# Patient Record
Sex: FEMALE | Race: WHITE | URBAN - METROPOLITAN AREA
[De-identification: names, ages, dates, MRNs, and addresses within clinical notes are randomized per-mention and may not be internally consistent; named-entity substitution may affect disease eponyms.]

---
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
## 2020-04-13 NOTE — TELEPHONE ENCOUNTER
----- Message from Elverna Apley Page sent at 2020  4:32 PM EDT -----  Regarding: Dr. Kailee Parr Telephone  Medication Refill    :  2000   ID numbers  #[id number] [id number]  Caller (if not patient): n/a  Relationship of caller (if not patient): n/a   Best contact number(s): [phone]  Name of medication and dosage if known:  Albuterol Inhaler  Is patient out of this medication (yes/no): Yes  Pharmacy name: [address] listed in chart? (yes/no): Yes  Pharmacy phone number:   Date of last visit:  2019 02:00 PM

## 2020-04-14 RX ORDER — ALBUTEROL SULFATE 90 UG/1
2 AEROSOL, METERED RESPIRATORY (INHALATION) AS NEEDED
Qty: 1 INHALER | Refills: 1 | Status: SHIPPED | OUTPATIENT
Start: 2020-04-14

## 2020-05-28 ENCOUNTER — VIRTUAL VISIT (OUTPATIENT)
Dept: FAMILY MEDICINE CLINIC | Age: 20
End: 2020-05-28

## 2020-05-28 VITALS — BODY MASS INDEX: 21.79 KG/M2 | TEMPERATURE: 97 F | WEIGHT: 123 LBS

## 2020-05-28 DIAGNOSIS — J02.9 SORE THROAT: Primary | ICD-10-CM

## 2020-05-28 NOTE — PROGRESS NOTES
Wu Stevens is a 23 y.o. female who was seen by synchronous (real-time) audio-video technology on 5/28/2020. Consent: Wu Stevens, who was seen by synchronous (real-time) audio-video technology, and/or her healthcare decision maker, is aware that this patient-initiated, Telehealth encounter on 5/28/2020 is a billable service, with coverage as determined by her insurance carrier. She is aware that she may receive a bill and has provided verbal consent to proceed: Yes. Assessment & Plan:   1. Sore throat  Supportive care, unlikely to be infections based on her social distancing but not impossible. Suggest watchful waiting  Add flonase qhs  Add antihistamine  Increase fluids  Cepacol/chloraseptic throat drops prn for releif  Throat Coat tea is ok and can be hlepful too  If new fevers or worsening symptoms, difficulty swallowing call for advice or seek care. Pt was counseled on risks, benefits and alternatives of treatment options. All questions were asked and answered and the patient was agreeable with the treatment plan as outlined. Subjective:   Wu Stevens is a 23 y.o. female who was seen [facility] S[facility] Th[facility]      Patient reports that she has had a bad sore throat for about 4 days, worsening more over the last 2 days. Taking advil every 6 hours for the past 4 days and the pain is pretty significant, terrible to swallow, lymph nodes. She is traveling to school to get stuff out of her dorm but she's been isolating otherwise  No fevers, checked her temp yesterday and it was 80  SHe's having a lot of post nasal drip with phlegm  Ears are not full, clicking or popping. Not using any allergy meds but has them at home so could if she needed to. Medications, allergies, PMH, PSH, SOCH, JAREN GUZMAN OF [facility] reviewed and updated per routine protocol, see chart for review and changes if not noted here.     ROS  A 12 point review of systems was negative except as noted here or in the HPI. Objective:   Vital Signs: (As obtained by patient/caregiver at home)  Visit Vitals  Temp 97 °F (36.1 °C) (Oral)   Wt 123 lb (55.8 kg)   BMI 21.79 kg/m²        [INSTRUCTIONS:  \"[x]\" Indicates a positive item  \"[]\" Indicates a negative item  -- DELETE ALL ITEMS NOT EXAMINED]    Constitutional: [x] Appears well-developed and well-nourished [x] No apparent distress      [] Abnormal -     Mental status: [x] Alert and awake  [x] Oriented to person/place/time [x] Able to follow commands    [] Abnormal -     Eyes:   EOM    [x]  Normal    [] Abnormal -   Sclera  [x]  Normal    [] Abnormal -          Discharge [x]  None visible   [] Abnormal -     HENT: [x] Normocephalic, atraumatic  [] Abnormal -   [x] Mouth/Throat: Mucous membranes are moist    External Ears [x] Normal  [] Abnormal -    Neck: [x] No visualized mass [] Abnormal -     Pulmonary/Chest: [x] Respiratory effort normal   [x] No visualized signs of difficulty breathing or respiratory distress        [] Abnormal -      Musculoskeletal:   [x] Normal gait with no signs of ataxia         [x] Normal range of motion of neck        [] Abnormal -     Neurological:        [x] No Facial Asymmetry (Cranial nerve 7 motor function) (limited exam due to video visit)          [x] No gaze palsy        [] Abnormal -          Skin:        [x] No significant exanthematous lesions or discoloration noted on facial skin         [] Abnormal -            Psychiatric:       [x] Normal Affect [] Abnormal -        [x] No Hallucinations    Other pertinent observable physical exam findings:pt reports tenderness with anterior cervical chain palpation of neck    We discussed the expected course, resolution and complications of the diagnosis(es) in detail. Medication risks, benefits, costs, interactions, and alternatives were discussed as indicated. I advised her to contact the office if her condition worsens, changes or fails to improve as anticipated.  She expressed understanding with the diagnosis(es) and plan. Jessica Nelson is a 23 y.o. female who was evaluated by a video visit encounter for concerns as above. Patient identification was verified prior to start of the visit. A caregiver was present when appropriate. Due to this being a TeleHealth encounter (During AFVV-60 public health emergency), evaluation of the following organ systems was limited: Vitals/Constitutional/EENT/Resp/CV/GI//MS/Neuro/Skin/Heme-Lymph-Imm. Pursuant to the emergency declaration under the [address] [place] [address], [facility] waiver authority and the [employer] Act, this Virtual  Visit was conducted, with patient's (and/or legal guardian's) consent, to reduce the patient's risk of exposure to COVID-19 and provide necessary medical care. Services were provided through a video synchronous discussion virtually to substitute for in-person clinic visit. Patient and provider were located at their individual homes. Sherine Vera MD  [facility]  05/28/20 11:11 AM     Portions of this note may have been populated using smart dictation software and may have \"sounds-like\" errors present.

## 2020-05-28 NOTE — PROGRESS NOTES
Chief Complaint   Patient presents with    Sore Throat     x 3 days - hurts to nick      Pt is having virtual appointment at home in [place]

## 2020-10-02 ENCOUNTER — TELEPHONE (OUTPATIENT)
Dept: FAMILY MEDICINE CLINIC | Age: 20
End: 2020-10-02

## 2020-10-02 RX ORDER — SERTRALINE HYDROCHLORIDE 50 MG/1
50 TABLET, FILM COATED ORAL DAILY
Qty: 90 TAB | Refills: 3 | Status: CANCELLED | OUTPATIENT
Start: 2020-10-02

## 2020-10-02 NOTE — TELEPHONE ENCOUNTER
Pt wants to restart Zoloft and decrease from 50 to 25 mg to start. Pt scheduled for virtual appointment on Monday to discuss with Dr. Evelyn Sweet

## 2020-12-03 ENCOUNTER — VIRTUAL VISIT (OUTPATIENT)
Dept: FAMILY MEDICINE CLINIC | Age: 20
End: 2020-12-03
Payer: OTHER GOVERNMENT

## 2020-12-03 DIAGNOSIS — F41.9 ANXIETY: Primary | ICD-10-CM

## 2020-12-03 DIAGNOSIS — G43.009 MIGRAINE WITHOUT AURA AND WITHOUT STATUS MIGRAINOSUS, NOT INTRACTABLE: ICD-10-CM

## 2020-12-03 DIAGNOSIS — Z86.16 HISTORY OF 2019 NOVEL CORONAVIRUS DISEASE (COVID-19): ICD-10-CM

## 2020-12-03 DIAGNOSIS — J45.30 MILD PERSISTENT ASTHMA WITHOUT COMPLICATION: ICD-10-CM

## 2020-12-03 PROCEDURE — 99214 OFFICE O/P EST MOD 30 MIN: CPT | Performed by: FAMILY MEDICINE

## 2020-12-03 RX ORDER — TOPIRAMATE 50 MG/1
50 TABLET, FILM COATED ORAL 2 TIMES DAILY
Qty: 60 TAB | Refills: 1 | Status: SHIPPED | OUTPATIENT
Start: 2020-12-03 | End: 2021-01-21 | Stop reason: SDUPTHER

## 2020-12-03 RX ORDER — AZITHROMYCIN 250 MG/1
TABLET, FILM COATED ORAL
COMMUNITY
Start: 2020-10-12 | End: 2020-12-03

## 2020-12-03 RX ORDER — PROMETHAZINE HYDROCHLORIDE AND DEXTROMETHORPHAN HYDROBROMIDE 6.25; 15 MG/5ML; MG/5ML
SYRUP ORAL
COMMUNITY
Start: 2020-10-12 | End: 2020-12-03

## 2020-12-03 NOTE — PROGRESS NOTES
Lulu Collazo is a 21 y.o. female who was seen by synchronous (real-time) audio-video technology on 12/3/2020. Consent: Lulu Collazo, who was seen by synchronous (real-time) audio-video technology, and/or her healthcare decision maker, is aware that this patient-initiated, Telehealth encounter on 12/3/2020 is a billable service, with coverage as determined by her insurance carrier. She is aware that she may receive a bill and has provided verbal consent to proceed: Yes. Assessment & Plan:   1. Anxiety  Discussed options for treatment  Agree it's a good idea to consider counseling  Pt thinks anxiety might stem from trouble with focus/attention and would consider medication  Discussed lexapro v wellbutrin (celexa made her feel flat and zoloft helped but she was missing the top range of emotions)    2. Mild persistent asthma without complication  This is stable, not exacerbated d/t covid19 per her report    3. Migraine without aura and without status migrainosus, not intractable  Ok to restart topamax, start with once daily in the evening for about 2 wk then can increase to one two times a day if needed which was previous dose that was working    4. History of 2019 novel coronavirus disease (COVID-19)  Pt is recovered, she reports, continue to stay safe as possible          Pt was counseled on risks, benefits and alternatives of treatment options. All questions were asked and answered and the patient was agreeable with the treatment plan as outlined. Encounter time today was 25 minutes and more than 50% of this encounter was spent in counseling face-to-face regarding Diagnosis, Patient Education, Medication Management, Compliance and Impressions. Time documented includes face to face time, documentation and chart review if applicable except as noted. Subjective:   Lulu Collazo is a 21 y.o. female who was seen for Medication Refill and Follow-up (Covid Pos.  10/2020 in [place].)      Pt got covid at school, she was hanging out with people at college and on her way home she realized she felt really sick. Her second day of symptoms she felt really sick. She drank as much as she could to stay hydrated and she was feeling really bad. Subsequently her roommate and some of their friends got it as well. 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 reviewed and updated per routine protocol, see chart for review and changes if not noted here. ROS  A 12 point review of systems was negative except as noted here or in the HPI.     Objective:   Vital Signs: (As obtained by patient/caregiver at home)  Patient-Reported Vitals 12/3/2020   Patient-Reported Weight 130lb        [INSTRUCTIONS:  \"[x]\" Indicates a positive item  \"[]\" Indicates a negative item  -- DELETE ALL ITEMS NOT EXAMINED]    Constitutional: [x] Appears well-developed and well-nourished [x] No apparent distress      [] Abnormal -     Mental status: [x] Alert and awake  [x] Oriented to person/place/time [x] Able to follow commands    [] Abnormal -     Eyes:   EOM [x]  Normal    [] Abnormal -   Sclera  [x]  Normal    [] Abnormal -          Discharge [x]  None visible   [] Abnormal -     HENT: [x] Normocephalic, atraumatic  [] Abnormal -   [x] Mouth/Throat: Mucous membranes are moist    External Ears [x] Normal  [] Abnormal -    Neck: [x] No visualized mass [] Abnormal -     Pulmonary/Chest: [x] Respiratory effort normal   [x] No visualized signs of difficulty breathing or respiratory distress        [] Abnormal -      Musculoskeletal:   [] Normal gait with no signs of ataxia         [x] Normal range of motion of neck        [] Abnormal -     Neurological:        [x] No Facial Asymmetry (Cranial nerve 7 motor function) (limited exam due to video visit)          [x] No gaze palsy        [] Abnormal -          Skin:        [x] No significant exanthematous lesions or discoloration noted on facial skin         [] Abnormal -            Psychiatric:       [x] Normal Affect [] Abnormal -        [x] No Hallucinations    Other pertinent observable physical exam findings:well appearing, no acute distress, nontoxic    We discussed the expected course, resolution and complications of the diagnosis(es) in detail. Medication risks, benefits, costs, interactions, and alternatives were discussed as indicated. I advised her to contact the office if her condition worsens, changes or fails to improve as anticipated. She expressed understanding with the diagnosis(es) and plan. Blue Jc is a 21 y.o. female who was evaluated by a video visit encounter for concerns as above. Patient identification was verified prior to start of the visit. A caregiver was present when appropriate. Due to this being a TeleHealth encounter (During MYOPO-41 public health emergency), evaluation of the following organ systems was limited: Vitals/Constitutional/EENT/Resp/CV/GI//MS/Neuro/Skin/Heme-Lymph-Imm.   Pursuant to the emergency declaration under the [facility] waiver authority and the [employer] and [employer] Act, this Virtual  Visit was conducted, with patient's (and/or legal guardian's) consent, to reduce the patient's risk of exposure to COVID-19 and provide necessary medical care. Services were provided through a video synchronous discussion virtually to substitute for in-person clinic visit. Patient and provider were located at their individual homes. Mac Pinon MD  [facility]  12/03/20 3:39 PM     Portions of this note may have been populated using smart dictation software and may have \"sounds-like\" errors present.

## 2020-12-03 NOTE — PROGRESS NOTES
Chief Complaint   Patient presents with    Medication Refill    Follow-up     Covid Pos. 10/2020 in [place]. 1. Have you been to the ER, urgent care clinic since your last visit? Hospitalized since your last visit? Covid Pos.   2. Have you seen or consulted any other health care providers outside of the [address] [place] [address] since your last visit? Include any pap smears or colon screening.  No

## 2020-12-17 NOTE — TELEPHONE ENCOUNTER
----- Message from [address] [place] sent at 12/17/2020 10:32 AM EST -----  Regarding: Prescription Question  Contact: [phone]  Hey! Im home now, could you send in the prescription for the wellbutrin to the [address] [place] [address]?

## 2020-12-18 RX ORDER — BUPROPION HYDROCHLORIDE 150 MG/1
150 TABLET ORAL DAILY
Qty: 30 TAB | Refills: 0 | Status: SHIPPED | OUTPATIENT
Start: 2020-12-18 | End: 2021-01-19 | Stop reason: SDUPTHER

## 2020-12-18 NOTE — TELEPHONE ENCOUNTER
New start wellbutrin was discussed last visit  1 tab daily in morning  pls let pt know I sent it  TAKE IN MORNING!

## 2021-01-19 RX ORDER — BUPROPION HYDROCHLORIDE 150 MG/1
150 TABLET ORAL DAILY
Qty: 90 TAB | Refills: 0 | Status: SHIPPED | OUTPATIENT
Start: 2021-01-19 | End: 2021-01-21 | Stop reason: SDUPTHER

## 2021-01-19 NOTE — TELEPHONE ENCOUNTER
Pt called to schedule an appt to address refill for wellbutrin. States in school in [place] so unable to schedule. Verified [facility] in [place] for refill.    Please call  [phone]

## 2021-01-21 ENCOUNTER — VIRTUAL VISIT (OUTPATIENT)
Dept: FAMILY MEDICINE CLINIC | Age: 21
End: 2021-01-21
Payer: OTHER GOVERNMENT

## 2021-01-21 DIAGNOSIS — Z71.85 VACCINE COUNSELING: ICD-10-CM

## 2021-01-21 DIAGNOSIS — Z86.16 HISTORY OF 2019 NOVEL CORONAVIRUS DISEASE (COVID-19): ICD-10-CM

## 2021-01-21 DIAGNOSIS — F41.9 ANXIETY: Primary | ICD-10-CM

## 2021-01-21 DIAGNOSIS — G43.009 MIGRAINE WITHOUT AURA AND WITHOUT STATUS MIGRAINOSUS, NOT INTRACTABLE: ICD-10-CM

## 2021-01-21 DIAGNOSIS — J45.30 MILD PERSISTENT ALLERGIC ASTHMA: ICD-10-CM

## 2021-01-21 PROCEDURE — 99214 OFFICE O/P EST MOD 30 MIN: CPT | Performed by: FAMILY MEDICINE

## 2021-01-21 RX ORDER — BUPROPION HYDROCHLORIDE 300 MG/1
300 TABLET ORAL DAILY
Qty: 90 TAB | Refills: 0 | Status: SHIPPED | OUTPATIENT
Start: 2021-01-21

## 2021-01-21 RX ORDER — TOPIRAMATE 100 MG/1
100 TABLET, FILM COATED ORAL
Qty: 90 TAB | Refills: 1 | Status: SHIPPED | OUTPATIENT
Start: 2021-01-21 | End: 2021-06-23 | Stop reason: SDUPTHER

## 2021-01-21 NOTE — PROGRESS NOTES
Christo Ortiz is a 21 y.o. female who was seen by synchronous (real-time) audio-video technology on 1/21/2021. Consent: Christo Ortiz, who was seen by synchronous (real-time) audio-video technology, and/or her healthcare decision maker, is aware that this patient-initiated, Telehealth encounter on 1/21/2021 is a billable service, with coverage as determined by her insurance carrier. She is aware that she may receive a bill and has provided verbal consent to proceed: Yes. Assessment & Plan:   1. Anxiety  Not noticing a marked difference as below but would like to up titrate wellbutrin  Will trial 300, she's hoping also for help with attention/motivation see last note as well  Will increase, will check in 4-6 wk to see how she is doing    2. Migraine without aura and without status migrainosus, not intractable  topamax restarted last visit, titrated up and is doing well at 100 mg daily and having less headaches  Change prescription and send new to pharmacy reflective of titration  Take qhs  Monitor weight  Discussion of breakthrough bleeding as side effect--we can watch this x 3 m and if not improved consider pivot to elavil, which might help mood as well as migraine    3. Vaccine counseling  Discussed in setting of hx of covid and while living in communal setting higher risk for re-kimberley covid and would strongly encourage vaccine when available. Discussed known r/b/a and have reviewed CDC up to date guidelines2    4. History of 2019 novel coronavirus disease (COVID-19)  As above    5. Mild persistent allergic asthma  Stable, well controlled as per note          Pt was counseled on risks, benefits and alternatives of treatment options. All questions were asked and answered and the patient was agreeable with the treatment plan as outlined.     Subjective:   Christo Ortiz is a 21 y.o. female who was seen for Medication Evaluation      The patient isn't sure she feels a difference on the wellbutrin  She feels a little zip of energy when she takes it but it is not sufficient for mood or motivation management  Still with anxiety    Migraines: not bad right now, we had gone back to topamax one tab a day, she went up to 2 tabs and no more headaches    Breathing: she is getting some exacerbation , she started her advair, has zyrtec if she needs it    Irregular menses: disappear from time to time and then come back, heavy bleeding x 2 week from time to time as well, she does have , this might have gotten worse with going up on the topamax    Medications, allergies, PMH, PSH, SOCH, JAREN GUZMAN OF [facility] reviewed and updated per routine protocol, see chart for review and changes if not noted here. ROS  A 12 point review of systems was negative except as noted here or in the HPI.     Objective:   Vital Signs: (As obtained by patient/caregiver at home)  Patient-Reported Vitals 1/21/2021   Patient-Reported Weight 135.2lb   Patient-Reported Temperature 96.3        [INSTRUCTIONS:  \"[x]\" Indicates a positive item  \"[]\" Indicates a negative item  -- DELETE ALL ITEMS NOT EXAMINED]    Constitutional: [x] Appears well-developed and well-nourished [x] No apparent distress      [] Abnormal -     Mental status: [x] Alert and awake  [x] Oriented to person/place/time [x] Able to follow commands    [] Abnormal -     Eyes:   EOM    [x]  Normal    [] Abnormal -   Sclera  [x]  Normal    [] Abnormal -          Discharge [x]  None visible   [] Abnormal -     HENT: [x] Normocephalic, atraumatic  [] Abnormal -   [x] Mouth/Throat: Mucous membranes are moist    External Ears [x] Normal  [] Abnormal -    Neck: [x] No visualized mass [] Abnormal -     Pulmonary/Chest: [x] Respiratory effort normal   [x] No visualized signs of difficulty breathing or respiratory distress        [] Abnormal -      Musculoskeletal:   [x] Normal gait with no signs of ataxia         [x] Normal range of motion of neck        [] Abnormal -     Neurological:        [x] No Facial Asymmetry (Cranial nerve 7 motor function) (limited exam due to video visit)          [x] No gaze palsy        [] Abnormal -          Skin:        [x] No significant exanthematous lesions or discoloration noted on facial skin         [] Abnormal -            Psychiatric:       [x] Normal Affect [] Abnormal -        [x] No Hallucinations    Other pertinent observable physical exam findings: well appearing, speaking full sentences    We discussed the expected course, resolution and complications of the diagnosis(es) in detail. Medication risks, benefits, costs, interactions, and alternatives were discussed as indicated. I advised her to contact the office if her condition worsens, changes or fails to improve as anticipated. She expressed understanding with the diagnosis(es) and plan. Min Zurita is a 21 y.o. female who was evaluated by a video visit encounter for concerns as above. Patient identification was verified prior to start of the visit. A caregiver was present when appropriate. Due to this being a TeleHealth encounter (During TLXOR-67 public health emergency), evaluation of the following organ systems was limited: Vitals/Constitutional/EENT/Resp/CV/GI//MS/Neuro/Skin/Heme-Lymph-Imm. Pursuant to the emergency declaration under the [facility], 1135 waiver authority and the [employer]iance [employer] and [employer] General Act, this Virtual  Visit was conducted, with patient's (and/or legal guardian's) consent, to reduce the patient's risk of exposure to COVID-19 and provide necessary medical care. Services were provided through a video synchronous discussion virtually to substitute for in-person clinic visit. Patient and provider were located at their individual homes.       Franki Mccord MD  [facility]  01/21/21 8:30 AM     Portions of this note may have been populated using smart dictation software and may have \"sounds-like\" errors present.

## 2021-01-21 NOTE — PROGRESS NOTES
Chief Complaint   Patient presents with    Medication Evaluation     1. Have you been to the ER, urgent care clinic since your last visit? Hospitalized since your last visit? No    2. Have you seen or consulted any other health care providers outside of the [address] [place] [address] since your last visit? Include any pap smears or colon screening.  No

## 2021-03-30 ENCOUNTER — TELEPHONE (OUTPATIENT)
Dept: FAMILY MEDICINE CLINIC | Age: 21
End: 2021-03-30

## 2021-03-30 DIAGNOSIS — F41.9 ANXIETY: Primary | ICD-10-CM

## 2021-03-30 NOTE — TELEPHONE ENCOUNTER
Pt called stating she's been referred to a psychiatrist by student health doctor onsite at [facility] in [place] and needs a  referral.     Pt has been in [place] for 2 years, has been using NP, Ruddy Leong as PCP, was referred to Dr. Angelica Linda but has not changed PCP from Dr. Oleksandr Perkins with  and referring provider advised her to contact PCP for referral.  Pt to have visit notes faxed to Dr. Oleksandr Perkins for review.  Kee

## 2021-04-01 NOTE — TELEPHONE ENCOUNTER
Chan referral to psychiatrist, Eric Arzola. Leilani Olmos submitted, and is pending review due to distance of over 600 miles from pt's listed address. Notation added that pt is currently enrolled in college in [place] and this provider is available at her student health facility.  Kee

## 2021-06-24 RX ORDER — TOPIRAMATE 100 MG/1
100 TABLET, FILM COATED ORAL
Qty: 90 TABLET | Refills: 1 | Status: SHIPPED | OUTPATIENT
Start: 2021-06-24

## 2022-03-19 PROBLEM — G43.109 MIGRAINE WITH AURA AND WITHOUT STATUS MIGRAINOSUS, NOT INTRACTABLE: Status: ACTIVE | Noted: 2019-03-19

## 2022-03-19 PROBLEM — J45.30 MILD PERSISTENT ALLERGIC ASTHMA: Status: ACTIVE | Noted: 2019-03-19

## 2022-03-20 PROBLEM — F41.9 ANXIETY: Status: ACTIVE | Noted: 2019-03-19

## 2023-05-22 RX ORDER — FLUTICASONE PROPIONATE AND SALMETEROL 250; 50 UG/1; UG/1
1 POWDER RESPIRATORY (INHALATION) EVERY 12 HOURS
COMMUNITY
Start: 2019-04-26

## 2023-05-22 RX ORDER — SUMATRIPTAN 100 MG/1
TABLET, FILM COATED ORAL PRN
COMMUNITY
Start: 2019-01-31

## 2023-05-22 RX ORDER — TOPIRAMATE 100 MG/1
1 TABLET, FILM COATED ORAL NIGHTLY
COMMUNITY
Start: 2021-06-24

## 2023-05-22 RX ORDER — ALBUTEROL SULFATE 90 UG/1
2 AEROSOL, METERED RESPIRATORY (INHALATION) PRN
COMMUNITY
Start: 2020-04-14

## 2023-05-22 RX ORDER — ELETRIPTAN HYDROBROMIDE 40 MG/1
TABLET, FILM COATED ORAL
COMMUNITY
Start: 2019-01-29

## 2023-05-22 RX ORDER — BUPROPION HYDROCHLORIDE 300 MG/1
300 TABLET ORAL DAILY
COMMUNITY
Start: 2021-01-21

## 2023-05-22 RX ORDER — ALBUTEROL SULFATE 2.5 MG/3ML
SOLUTION RESPIRATORY (INHALATION) PRN
COMMUNITY
Start: 2018-11-14

## 2023-10-18 ENCOUNTER — TELEMEDICINE (OUTPATIENT)
Facility: CLINIC | Age: 23
End: 2023-10-18
Payer: OTHER GOVERNMENT

## 2023-10-18 DIAGNOSIS — F41.9 ANXIETY: ICD-10-CM

## 2023-10-18 DIAGNOSIS — F32.81 PMDD (PREMENSTRUAL DYSPHORIC DISORDER): ICD-10-CM

## 2023-10-18 DIAGNOSIS — F90.0 ADHD, PREDOMINANTLY INATTENTIVE TYPE: Primary | ICD-10-CM

## 2023-10-18 DIAGNOSIS — K58.2 IRRITABLE BOWEL SYNDROME WITH BOTH CONSTIPATION AND DIARRHEA: ICD-10-CM

## 2023-10-18 PROCEDURE — 99215 OFFICE O/P EST HI 40 MIN: CPT | Performed by: FAMILY MEDICINE

## 2023-10-18 RX ORDER — METHYLPHENIDATE HYDROCHLORIDE 27 MG/1
27 TABLET ORAL DAILY
Qty: 30 TABLET | Refills: 0 | Status: SHIPPED | OUTPATIENT
Start: 2023-10-18 | End: 2023-11-17

## 2023-10-18 RX ORDER — BUSPIRONE HYDROCHLORIDE 15 MG/1
15 TABLET ORAL 2 TIMES DAILY
COMMUNITY
Start: 2022-11-29

## 2023-10-18 RX ORDER — LEVONORGESTREL 19.5 MG/1
1 INTRAUTERINE DEVICE INTRAUTERINE ONCE
COMMUNITY

## 2023-10-18 RX ORDER — CITALOPRAM HYDROBROMIDE 10 MG/1
10 TABLET ORAL DAILY
Qty: 90 TABLET | Refills: 0 | Status: SHIPPED | OUTPATIENT
Start: 2023-10-18

## 2023-10-18 RX ORDER — DICYCLOMINE HYDROCHLORIDE 10 MG/1
10 CAPSULE ORAL 4 TIMES DAILY
Qty: 120 CAPSULE | Refills: 1 | Status: SHIPPED | OUTPATIENT
Start: 2023-10-18

## 2023-10-18 RX ORDER — METHYLPHENIDATE HYDROCHLORIDE 27 MG/1
TABLET ORAL
COMMUNITY
Start: 2023-02-01

## 2023-10-18 SDOH — ECONOMIC STABILITY: FOOD INSECURITY: WITHIN THE PAST 12 MONTHS, THE FOOD YOU BOUGHT JUST DIDN'T LAST AND YOU DIDN'T HAVE MONEY TO GET MORE.: NEVER TRUE

## 2023-10-18 SDOH — ECONOMIC STABILITY: INCOME INSECURITY: HOW HARD IS IT FOR YOU TO PAY FOR THE VERY BASICS LIKE FOOD, HOUSING, MEDICAL CARE, AND HEATING?: NOT HARD AT ALL

## 2023-10-18 SDOH — ECONOMIC STABILITY: HOUSING INSECURITY
IN THE LAST 12 MONTHS, WAS THERE A TIME WHEN YOU DID NOT HAVE A STEADY PLACE TO SLEEP OR SLEPT IN A SHELTER (INCLUDING NOW)?: NO

## 2023-10-18 SDOH — ECONOMIC STABILITY: FOOD INSECURITY: WITHIN THE PAST 12 MONTHS, YOU WORRIED THAT YOUR FOOD WOULD RUN OUT BEFORE YOU GOT MONEY TO BUY MORE.: NEVER TRUE

## 2023-10-18 ASSESSMENT — PATIENT HEALTH QUESTIONNAIRE - PHQ9
SUM OF ALL RESPONSES TO PHQ QUESTIONS 1-9: 1
SUM OF ALL RESPONSES TO PHQ9 QUESTIONS 1 & 2: 1
SUM OF ALL RESPONSES TO PHQ QUESTIONS 1-9: 1
2. FEELING DOWN, DEPRESSED OR HOPELESS: 1
1. LITTLE INTEREST OR PLEASURE IN DOING THINGS: 0

## 2023-10-18 NOTE — PROGRESS NOTES
Lisa Peraza is a 25 y.o. female who was seen by synchronous (real-time) audio-video technology on 10/18/2023. Consent: Lisa Peraza, who was seen by synchronous (real-time) audio-video technology, and/or her healthcare decision maker, is aware that this patient-initiated, Telehealth encounter on 10/18/2023 is a billable service, with coverage as determined by her insurance carrier. She is aware that she may receive a bill and has provided verbal consent to proceed: Yes. Assessment & Plan:      Diagnosis Orders   1. ADHD, predominantly inattentive type  Compliance Drug Analysis, Urine    methylphenidate (CONCERTA) 27 MG extended release tablet      2. PMDD (premenstrual dysphoric disorder)  citalopram (CELEXA) 10 MG tablet      3. Anxiety        4. Irritable bowel syndrome with both constipation and diarrhea          Vinita Whitney is here today in follow-up, she was last seen about 2 years ago and has recently moved back to [place]  Since that last visit she was diagnosed with ADHD and she has sent me the neuropsych testing it is available in my chart she scanned it as a document  She has been on Concerta, she has been taking 18 mg and then increase to 27 mg which seems to be a good fit for her  This has been prescribed by her psychiatrist  She does need to transition care to a physician here in [place]  She is willing to go do the urine drug  She understands that my prescribing practices to see patients with [facility] controlled substances every 3 months at minimum and to follow the best practices put forth  She is agreeable to this and we have already scheduled an in person visit for her  She is also complaining of symptoms of PMDD, we discussed the different modalities for treatment and she is interested in trying symptom driven treatment  She will start taking 1 Celexa daily at the onset of her symptoms and will continue until day 2 or 3 of her.   And then she will discontinue until the next month  She has a

## 2023-10-18 NOTE — PROGRESS NOTES
Chief Complaint   Patient presents with    Discuss Medications    Medication Refill     1. Have you been to the ER, urgent care clinic since your last visit? Hospitalized since your last visit? No    2. Have you seen or consulted any other health care providers outside of the [address] [place] [address] Avenue since your last visit? Include any pap smears or colon screening.  No

## 2023-11-03 NOTE — TELEPHONE ENCOUNTER
90 day refill request for Hyoscyamine 0.125 mg tablets placing 1 tab under tongue every 4 hours as needed for cramping faxed by [facility] in [facility] on file. Dispense quantity of 540 tablets requested. Can nurse please pend medication to provider?   Daniel

## 2023-11-09 RX ORDER — DICYCLOMINE HYDROCHLORIDE 10 MG/1
10 CAPSULE ORAL 4 TIMES DAILY
Qty: 360 CAPSULE | Refills: 0 | Status: SHIPPED | OUTPATIENT
Start: 2023-11-09

## 2024-01-03 ENCOUNTER — PATIENT MESSAGE (OUTPATIENT)
Facility: CLINIC | Age: 24
End: 2024-01-03

## 2024-01-03 DIAGNOSIS — F90.0 ADHD, PREDOMINANTLY INATTENTIVE TYPE: Primary | ICD-10-CM

## 2024-01-04 RX ORDER — BUSPIRONE HYDROCHLORIDE 15 MG/1
15 TABLET ORAL 2 TIMES DAILY
Qty: 180 TABLET | Refills: 1 | Status: SHIPPED | OUTPATIENT
Start: 2024-01-04

## 2024-01-08 DIAGNOSIS — F90.0 ADHD, PREDOMINANTLY INATTENTIVE TYPE: ICD-10-CM

## 2024-01-08 DIAGNOSIS — F32.81 PMDD (PREMENSTRUAL DYSPHORIC DISORDER): ICD-10-CM

## 2024-01-08 RX ORDER — METHYLPHENIDATE HYDROCHLORIDE 27 MG/1
27 TABLET ORAL DAILY
Qty: 30 TABLET | Refills: 0 | Status: SHIPPED | OUTPATIENT
Start: 2024-02-07 | End: 2024-03-08

## 2024-01-08 RX ORDER — METHYLPHENIDATE HYDROCHLORIDE 27 MG/1
27 TABLET ORAL DAILY
Qty: 30 TABLET | Refills: 0 | Status: SHIPPED | OUTPATIENT
Start: 2024-03-08 | End: 2024-04-07

## 2024-01-08 RX ORDER — METHYLPHENIDATE HYDROCHLORIDE 27 MG/1
27 TABLET ORAL DAILY
Qty: 30 TABLET | Refills: 0 | Status: SHIPPED | OUTPATIENT
Start: 2024-01-08 | End: 2024-01-08 | Stop reason: SDUPTHER

## 2024-01-08 NOTE — TELEPHONE ENCOUNTER
Kt Jordan LPN 1/3/2024 11:38 AM EST    Buspar has been pended.  ----- Message -----  From: Francine Viveros  Sent: 1/3/2024 10:53 AM EST  To: *  Subject: Medication refill questions     Washington Moreira, I am trying to get a refill for the buspar 15 mg. Although my chart says it is unavailable for refill? I’m not sure what steps need to be taken for refill so please let me know what I can do!     Also, I am going to get labs done today for the urinalysis and would greatly appreciate a refill for the Concerta 27 mg sent in once the test results come in.     Thanks so much!

## 2024-01-10 RX ORDER — BUSPIRONE HYDROCHLORIDE 15 MG/1
15 TABLET ORAL 2 TIMES DAILY
Qty: 60 TABLET | Refills: 0 | Status: SHIPPED | OUTPATIENT
Start: 2024-01-10 | End: 2024-01-10 | Stop reason: SDUPTHER

## 2024-01-10 RX ORDER — METHYLPHENIDATE HYDROCHLORIDE 27 MG/1
27 TABLET ORAL DAILY
Qty: 30 TABLET | Refills: 0 | Status: SHIPPED | OUTPATIENT
Start: 2024-01-10 | End: 2024-02-09

## 2024-01-10 RX ORDER — CITALOPRAM HYDROBROMIDE 10 MG/1
10 TABLET ORAL DAILY
Qty: 90 TABLET | Refills: 0 | Status: SHIPPED | OUTPATIENT
Start: 2024-01-10

## 2024-01-10 RX ORDER — BUSPIRONE HYDROCHLORIDE 15 MG/1
15 TABLET ORAL 2 TIMES DAILY
Qty: 180 TABLET | Refills: 3 | Status: SHIPPED | OUTPATIENT
Start: 2024-01-10

## 2024-01-10 NOTE — TELEPHONE ENCOUNTER
Pt called to check on status of refills for Methylphenidate and Buspirone requested via eTect to be sent to a different pharmacy due to insurance, stating prescriptions were sent to [facility] and her insurance now prefers [facility] pharmacy.      Pt sent message 2 days ago and says no one responded. Daniel

## 2024-01-10 NOTE — TELEPHONE ENCOUNTER
Nursing can you please call the [facility] pharmacy, when I submitted the 60 tablets for 15 mg twice a day and then subsequently sent a supply to her mail order pharmacy the computer auto canceled her local prescription for BuSpar at the [facility].  Can you give a verbal so that we can overcome this obstacle.  Thank you so much

## 2024-02-15 DIAGNOSIS — F90.0 ADHD, PREDOMINANTLY INATTENTIVE TYPE: ICD-10-CM

## 2024-02-16 RX ORDER — METHYLPHENIDATE HYDROCHLORIDE 27 MG/1
27 TABLET ORAL DAILY
Qty: 30 TABLET | Refills: 0 | Status: SHIPPED | OUTPATIENT
Start: 2024-02-16 | End: 2024-03-17

## 2024-02-16 NOTE — TELEPHONE ENCOUNTER
We rescheduled so happy to send, sorry for the inconvenience and if she keeps upcoming appt I'm happy to send future rx for 3 mo but please make sure she's aware I'm leaving. I believe she lives >1 h from here and she may want to start now looking for a new PCP closer to home!